# Patient Record
Sex: MALE | Race: WHITE | URBAN - METROPOLITAN AREA
[De-identification: names, ages, dates, MRNs, and addresses within clinical notes are randomized per-mention and may not be internally consistent; named-entity substitution may affect disease eponyms.]

---

## 2019-08-01 ENCOUNTER — Encounter (OUTPATIENT)
Dept: URBAN - METROPOLITAN AREA CLINIC 71 | Facility: CLINIC | Age: 74
End: 2019-08-01
Payer: MEDICARE

## 2019-08-01 PROCEDURE — 92134 CPTRZ OPH DX IMG PST SGM RTA: CPT | Performed by: OPTOMETRIST

## 2019-08-01 PROCEDURE — 92014 COMPRE OPH EXAM EST PT 1/>: CPT | Performed by: OPTOMETRIST

## 2020-08-18 ENCOUNTER — OFFICE VISIT (OUTPATIENT)
Dept: URBAN - METROPOLITAN AREA CLINIC 71 | Facility: CLINIC | Age: 75
End: 2020-08-18
Payer: MEDICARE

## 2020-08-18 DIAGNOSIS — H25.13 AGE-RELATED NUCLEAR CATARACT, BILATERAL: Primary | ICD-10-CM

## 2020-08-18 DIAGNOSIS — H52.4 PRESBYOPIA: ICD-10-CM

## 2020-08-18 PROCEDURE — 92134 CPTRZ OPH DX IMG PST SGM RTA: CPT | Performed by: OPTOMETRIST

## 2020-08-18 PROCEDURE — 92133 CPTRZD OPH DX IMG PST SGM ON: CPT | Performed by: OPTOMETRIST

## 2020-08-18 PROCEDURE — 92014 COMPRE OPH EXAM EST PT 1/>: CPT | Performed by: OPTOMETRIST

## 2020-08-18 ASSESSMENT — INTRAOCULAR PRESSURE
OS: 11
OD: 12

## 2020-08-18 ASSESSMENT — VISUAL ACUITY
OS: 20/20
OD: 20/20

## 2020-08-18 NOTE — IMPRESSION/PLAN
Impression: Presbyopia: H52.4.

new glasses rx given today Plan: Presbyopia is the inability to focus on objects (ie: accommodate) due to the loss of flexibility of your natural lens. Presbyopia occurs with age. Reading glasses, bifocals, trifocals or contacts can be helpful. Contact the office if difficulty focusing persists despite corrective eye wear. Astigmatism causes blurred vision due to either an irregularly shaped cornea or the curvature of the lens. Small amounts of astigmatism do not need to be treated, but larger amounts can cause visual distortion, blurred vision, eye strain and headaches.

## 2020-08-18 NOTE — IMPRESSION/PLAN
Impression: Vitreous degeneration, bilateral: H43.813. Plan: The PVD is stable, and there is no evidence of a retinal tear or detachment on dilated exam.  I again reviewed the signs and symptoms of a retinal tear and detachment in detail with the patient, including worsening flashes, new floaters, and development of a shadow/curtain in the peripheral visual field. The patient was advised to call immediately with any changes to [address] or increase in symptoms.

## 2020-08-18 NOTE — IMPRESSION/PLAN
Impression: Age-related nuclear cataract, bilateral: H25.13.

continue to monitor until PT notes problems with vision and glare Plan: Cataracts do not require treatment unless they interfere with vision and impact one's activities of daily living, in which case cataract extraction with lens implant insertion should be performed. Cataracts occur in everyone as they age. Contact the office if you experience progressive loss of vision, increasing glare, and problems with daily activities such as driving, reading, watching tv, seeing street signs, or following a golf ball.

## 2021-08-20 ENCOUNTER — OFFICE VISIT (OUTPATIENT)
Dept: URBAN - METROPOLITAN AREA CLINIC 71 | Facility: CLINIC | Age: 76
End: 2021-08-20
Payer: MEDICARE

## 2021-08-20 DIAGNOSIS — H53.2 DIPLOPIA: ICD-10-CM

## 2021-08-20 DIAGNOSIS — H43.813 VITREOUS DEGENERATION, BILATERAL: ICD-10-CM

## 2021-08-20 PROCEDURE — 99204 OFFICE O/P NEW MOD 45 MIN: CPT | Performed by: OPHTHALMOLOGY

## 2021-08-20 RX ORDER — OMEPRAZOLE 20 MG/1
20 MG CAPSULE, DELAYED RELEASE ORAL
Qty: 0 | Refills: 0 | Status: ACTIVE
Start: 2021-08-20

## 2021-08-20 ASSESSMENT — INTRAOCULAR PRESSURE
OS: 11
OD: 11

## 2021-08-20 NOTE — IMPRESSION/PLAN
Impression: Diplopia: H53.2. 5 PD esotropia, pt would liek to hold off as long as possible for cataract surgery. Plan: Discussed dx with patient.

## 2022-08-22 ENCOUNTER — OFFICE VISIT (OUTPATIENT)
Dept: URBAN - METROPOLITAN AREA CLINIC 71 | Facility: CLINIC | Age: 77
End: 2022-08-22
Payer: MEDICARE

## 2022-08-22 DIAGNOSIS — H25.13 AGE-RELATED NUCLEAR CATARACT, BILATERAL: Primary | ICD-10-CM

## 2022-08-22 DIAGNOSIS — H53.2 DIPLOPIA: ICD-10-CM

## 2022-08-22 DIAGNOSIS — H43.813 VITREOUS DEGENERATION, BILATERAL: ICD-10-CM

## 2022-08-22 PROCEDURE — 99214 OFFICE O/P EST MOD 30 MIN: CPT | Performed by: OPHTHALMOLOGY

## 2022-08-22 ASSESSMENT — INTRAOCULAR PRESSURE
OS: 10
OD: 11

## 2022-08-22 NOTE — IMPRESSION/PLAN
Impression: Age-related nuclear cataract, bilateral: H25.13. Plan: Cataracts are stable and PT reports his vision is not bothering him to much at this time. No treatment currently recommended. The patient will monitor vision changes and contact us with any decrease in vision.

## 2022-08-22 NOTE — IMPRESSION/PLAN
Impression: Diplopia: H53.2. Plan: 8 PD Esophoria. Discussed the options of treatment are prism or surgery but since the diplopia is not a constant issue we will just continue to monitor for now. If the double vision worsens and becomes constant he can RTC for evaluation of prism with an OPTOM.

## 2023-09-13 ENCOUNTER — OFFICE VISIT (OUTPATIENT)
Dept: URBAN - METROPOLITAN AREA CLINIC 71 | Facility: CLINIC | Age: 78
End: 2023-09-13
Payer: MEDICARE

## 2023-09-13 DIAGNOSIS — H43.813 VITREOUS DEGENERATION, BILATERAL: ICD-10-CM

## 2023-09-13 DIAGNOSIS — H52.4 PRESBYOPIA: ICD-10-CM

## 2023-09-13 DIAGNOSIS — H25.13 AGE-RELATED NUCLEAR CATARACT, BILATERAL: ICD-10-CM

## 2023-09-13 DIAGNOSIS — H35.373 PUCKERING OF MACULA, BILATERAL: Primary | ICD-10-CM

## 2023-09-13 DIAGNOSIS — H53.2 DIPLOPIA: ICD-10-CM

## 2023-09-13 PROCEDURE — 92134 CPTRZ OPH DX IMG PST SGM RTA: CPT | Performed by: OPTOMETRIST

## 2023-09-13 PROCEDURE — 99214 OFFICE O/P EST MOD 30 MIN: CPT | Performed by: OPTOMETRIST

## 2023-09-13 ASSESSMENT — INTRAOCULAR PRESSURE
OD: 14
OS: 13

## 2023-09-13 ASSESSMENT — VISUAL ACUITY
OS: 20/20
OD: 20/30

## 2024-09-17 ENCOUNTER — OFFICE VISIT (OUTPATIENT)
Dept: URBAN - METROPOLITAN AREA CLINIC 71 | Facility: CLINIC | Age: 79
End: 2024-09-17
Payer: MEDICARE

## 2024-09-17 DIAGNOSIS — H35.373 PUCKERING OF MACULA, BILATERAL: Primary | ICD-10-CM

## 2024-09-17 DIAGNOSIS — H43.813 VITREOUS DEGENERATION, BILATERAL: ICD-10-CM

## 2024-09-17 DIAGNOSIS — H25.13 AGE-RELATED NUCLEAR CATARACT, BILATERAL: ICD-10-CM

## 2024-09-17 PROCEDURE — 99213 OFFICE O/P EST LOW 20 MIN: CPT

## 2024-09-17 PROCEDURE — 92134 CPTRZ OPH DX IMG PST SGM RTA: CPT

## 2024-09-17 PROCEDURE — 92133 CPTRZD OPH DX IMG PST SGM ON: CPT

## 2024-09-17 ASSESSMENT — INTRAOCULAR PRESSURE
OS: 11
OD: 12